# Patient Record
Sex: MALE | Race: WHITE | Employment: OTHER | URBAN - METROPOLITAN AREA
[De-identification: names, ages, dates, MRNs, and addresses within clinical notes are randomized per-mention and may not be internally consistent; named-entity substitution may affect disease eponyms.]

---

## 2017-05-03 ENCOUNTER — OFFICE VISIT (OUTPATIENT)
Dept: CARDIOLOGY | Facility: CLINIC | Age: 82
End: 2017-05-03
Attending: INTERNAL MEDICINE
Payer: COMMERCIAL

## 2017-05-03 VITALS
BODY MASS INDEX: 22.48 KG/M2 | DIASTOLIC BLOOD PRESSURE: 70 MMHG | WEIGHT: 166 LBS | HEART RATE: 72 BPM | HEIGHT: 72 IN | SYSTOLIC BLOOD PRESSURE: 140 MMHG

## 2017-05-03 DIAGNOSIS — I10 ESSENTIAL HYPERTENSION: ICD-10-CM

## 2017-05-03 DIAGNOSIS — E78.00 PURE HYPERCHOLESTEROLEMIA: ICD-10-CM

## 2017-05-03 DIAGNOSIS — I25.10 CORONARY ARTERY DISEASE INVOLVING NATIVE CORONARY ARTERY OF NATIVE HEART WITHOUT ANGINA PECTORIS: ICD-10-CM

## 2017-05-03 PROCEDURE — 99214 OFFICE O/P EST MOD 30 MIN: CPT | Performed by: INTERNAL MEDICINE

## 2017-05-03 RX ORDER — ATORVASTATIN CALCIUM 10 MG/1
10 TABLET, FILM COATED ORAL DAILY
Qty: 90 TABLET | Refills: 3 | Status: SHIPPED | OUTPATIENT
Start: 2017-05-03 | End: 2018-05-17

## 2017-05-03 RX ORDER — LISINOPRIL 10 MG/1
10 TABLET ORAL DAILY
Qty: 90 TABLET | Refills: 3 | Status: SHIPPED | OUTPATIENT
Start: 2017-05-03 | End: 2018-05-17

## 2017-05-03 NOTE — PROGRESS NOTES
HPI and Plan:   See dictation:[id number]    Orders Placed This Encounter   Procedures     NM Exercise stress test (nuc card)     Follow-Up with Cardiologist       Orders Placed This Encounter   Medications     DiphenhydrAMINE HCl (ALLERGY MEDICATION PO)     Sig: Take 1 tablet by mouth as needed     atorvastatin (LIPITOR) 10 MG tablet     Sig: Take 1 tablet (10 mg) by mouth daily     Dispense:  90 tablet     Refill:  3     lisinopril (PRINIVIL/ZESTRIL) 10 MG tablet     Sig: Take 1 tablet (10 mg) by mouth daily     Dispense:  90 tablet     Refill:  3       Medications Discontinued During This Encounter   Medication Reason     atorvastatin (LIPITOR) 10 MG tablet Reorder     lisinopril (PRINIVIL,ZESTRIL) 10 MG tablet Reorder         Encounter Diagnoses   Name Primary?     Coronary artery disease involving native coronary artery of native heart without angina pectoris      Essential hypertension      Pure hypercholesterolemia        CURRENT MEDICATIONS:  Current Outpatient Prescriptions   Medication Sig Dispense Refill     DiphenhydrAMINE HCl (ALLERGY MEDICATION PO) Take 1 tablet by mouth as needed       atorvastatin (LIPITOR) 10 MG tablet Take 1 tablet (10 mg) by mouth daily 90 tablet 3     lisinopril (PRINIVIL/ZESTRIL) 10 MG tablet Take 1 tablet (10 mg) by mouth daily 90 tablet 3     ASPIRIN PO Take 81 mg by mouth        [DISCONTINUED] atorvastatin (LIPITOR) 10 MG tablet Take 1 tablet (10 mg) by mouth daily 90 tablet 3     [DISCONTINUED] lisinopril (PRINIVIL,ZESTRIL) 10 MG tablet Take 1 tablet (10 mg) by mouth daily 90 tablet 3       ALLERGIES   No Known Allergies    PAST MEDICAL HISTORY:  Past Medical History:   Diagnosis Date     Atrial fibrillation (H)      CAD (coronary artery disease)      HLD (hyperlipidaemia)      HTN (hypertension)        PAST SURGICAL HISTORY:  Past Surgical History:   Procedure Laterality Date     BYPASS CARDIOPULMONARY  1999     CORONARY ANGIOGRAPHY ADULT ORDER  2/1998 2/1998: Multivessel  disease, referred for CABG     CORONARY ARTERY BYPASS  2/1998 2/1998: LIMA to LAD, Left radial to diagonal and OM1       FAMILY HISTORY:  Family History   Problem Relation Age of Onset     CANCER Mother        SOCIAL HISTORY:  Social History     Social History     Marital status:      Spouse name: N/A     Number of children: N/A     Years of education: N/A     Social History Main Topics     Smoking status: Never Smoker     Smokeless tobacco: None     Alcohol use Yes      Comment: social     Drug use: No     Sexual activity: Not Asked     Other Topics Concern     Caffeine Concern No     some coffee     Exercise Yes     walking outside in nice weather. DVDs/ Wii     Seat Belt Yes     Social History Narrative       Review of Systems:  Skin:  Negative       Eyes:  Negative      ENT:  Negative      Respiratory:  Negative       Cardiovascular:  Negative      Gastroenterology: Negative      Genitourinary:  not assessed      Musculoskeletal:  Positive for neck pain (and into left shoulder)    Neurologic:  Negative      Psychiatric:  Negative      Heme/Lymph/Imm:  Positive for allergies    Endocrine:  Negative        Physical Exam:  Vitals: /70 (BP Location: Right arm, Cuff Size: Adult Large)  Pulse 72  Ht 1.829 m (6')  Wt 75.3 kg (166 lb)  BMI 22.51 kg/m2    Constitutional:           Skin:           Head:           Eyes:           ENT:           Neck:           Chest:             Cardiac:                    Abdomen:           Vascular:                                          Extremities and Back:                 Neurological:                 CC  Ag Garcia MD   [facility]  [address]  [place] [id number]

## 2017-05-03 NOTE — LETTER
5/3/2017    Amanda J. [facility] [place]   [address]    [facility] [address]    RE: Samuel Enriquez       Dear Colleague,    I again had the pleasure of seeing your patient, Samuel Enriquez, at [facility] for evaluation of coronary artery disease, hyperlipidemia and hypertension.  The patient is status post 3-vessel coronary artery bypass surgery in 02/1999 utilizing the left internal mammary artery to bypass the LAD, left radial artery to the diagonal and first obtuse marginal branch arteries.  He had a 40%-50% mid right coronary artery stenosis with contralateral collaterals filling the mid and distal LAD.  He had normal left ventricular function.  His last nuclear stress test performed 08/2011 demonstrated no areas of ischemia or infarction and a normal ejection fraction.  He denies PND, orthopnea or peripheral edema.  He denies shortness of breath or angina pectoris.  He denies myalgias or myopathy but does note some neck, back and shoulder pain from time to time.  He uses an exercycle 3-4 times per week for 15 minutes and does some walking free of angina.  He denies hospitalizations or surgery since I saw him last year.  His blood pressure has been well treated with low-dose lisinopril.  He continues to help his wife who is being treated for lymphoma.      His fasting lipid profile last performed 10/25/2016 was under excellent control with LDL of 81, HDL 52, total cholesterol 147 and triglycerides 72.  This was after our change from simvastatin to atorvastatin.      PHYSICAL EXAMINATION:   VITAL SIGNS:  Current blood pressure is 140/70, pulse is 72 and regular, weight is 166 pounds, an increase of 3 pounds, BMI is 23.   NECK:  Supple without carotid bruits.  Brisk upstroke.   CHEST:  Clear to auscultation.   CARDIAC:  Regular rate and rhythm, normal S1 and S2 with an S4 gallop but no S3 or murmur.  No JVD.  Pulses were all intact without bruits.  Sternotomy  scar is well-healed.  The left radial harvesting site is normal with excellent distal pulse.   ABDOMEN:  Benign.   EXTREMITIES:  Without cyanosis, clubbing or edema.     Outpatient Encounter Prescriptions as of 5/3/2017   Medication Sig Dispense Refill     DiphenhydrAMINE HCl (ALLERGY MEDICATION PO) Take 1 tablet by mouth as needed       atorvastatin (LIPITOR) 10 MG tablet Take 1 tablet (10 mg) by mouth daily 90 tablet 3     lisinopril (PRINIVIL/ZESTRIL) 10 MG tablet Take 1 tablet (10 mg) by mouth daily 90 tablet 3     ASPIRIN PO Take 81 mg by mouth        [DISCONTINUED] atorvastatin (LIPITOR) 10 MG tablet Take 1 tablet (10 mg) by mouth daily 90 tablet 3     [DISCONTINUED] lisinopril (PRINIVIL,ZESTRIL) 10 MG tablet Take 1 tablet (10 mg) by mouth daily 90 tablet 3     No facility-administered encounter medications on file as of 5/3/2017.       ASSESSMENT:   1.  Samuel Enriquez is a delightful 81-year-old male status post 3-vessel coronary artery bypass surgery in 02/1998.  He remains stable with a normal nuclear stress test in 2011.  We are going to repeat that nuclear stress test in 1 year.  He will continue with his current exercise and call for angina pectoris.   2.  Borderline systolic hypertension.  We will monitor this carefully.  We would not treat his current blood pressure with increasing the lisinopril.   3.  Hyperlipidemia, currently under excellent control.  This can be repeated in 1-2 years.   4.  The patient describes some neck, back and shoulder pain.  It is unlikely to represent myalgias from his atorvastatin but if it worsens we would certainly stop his atorvastatin and see if this has some benefit.  He recently took a trip to [facility] in February and March and did quite well.  He is planning a trip this summer as well and if he is having difficulty with muscle weakness or myalgias he will call.      It is my pleasure to assist in the care of Samuel Enriquez.  All his questions were answered to his  satisfaction.     Sincerely,    Ag Garcia MD     [facility]

## 2017-05-03 NOTE — MR AVS SNAPSHOT
"              After Visit Summary   5/3/2017    Samuel Enriquez    MRN: [id number]           Patient Information     Date Of Birth          1935        Visit Information        Provider Department      5/3/2017 11:00 AM Ag Garcia MD [facility]        Today's Diagnoses     Coronary artery disease involving native coronary artery of native heart without angina pectoris        Essential hypertension        Pure hypercholesterolemia           Follow-ups after your visit        Additional Services     Follow-Up with Cardiologist                 Future tests that were ordered for you today     Open Future Orders        Priority Expected Expires Ordered    Follow-Up with Cardiologist Routine 5/3/2018 9/15/2018 5/3/2017    NM Exercise stress test (nuc card) Routine 5/3/2018 6/7/2018 5/3/2017            Who to contact     If you have questions or need follow up information about today's clinic visit or your schedule please contact [facility] directly at [phone].  Normal or non-critical lab and imaging results will be communicated to you by MyChart, letter or phone within 4 business days after the clinic has received the results. If you do not hear from us within 7 days, please contact the clinic through WeGreek[employer] or phone. If you have a critical or abnormal lab result, we will notify you by phone as soon as possible.  Submit refill requests through [employer] or call your pharmacy and they will forward the refill request to us. Please allow 3 business days for your refill to be completed.          Additional Information About Your Visit        WeGreek[employer] Information     [employer]otomo lets you send messages to your doctor, view your test results, renew your prescriptions, schedule appointments and more. To sign up, go to [email or web address] . Click on \"Log in\" on the left side of the screen, which will take you to the Welcome page. " "Then click on \"Sign up Now\" on the right side of the page.     You will be asked to enter the access code listed below, as well as some personal information. Please follow the directions to create your username and password.     Your access code is: [email or web address]  Expires: 2017 11:36 AM     Your access code will  in 90 days. If you need help or a new code, please call your [facility] clinic or [phone].        Care EveryWhere ID     This is your Care EveryWhere ID. This could be used by other organizations to access your [facility] medical records  [id number]        Your Vitals Were     Pulse Height BMI (Body Mass Index)             72 1.829 m (6') 22.51 kg/m2          Blood Pressure from Last 3 Encounters:   17 140/70   16 135/70   05/16/15 123/64    Weight from Last 3 Encounters:   17 75.3 kg (166 lb)   16 73.9 kg (163 lb)   05/16/15 70.3 kg (155 lb)              We Performed the Following     Follow-Up with Cardiologist          Where to get your medicines      These medications were sent to [facility] [address] [facility] [address] AT [facility] [address]  [address] [facility] [address]     Phone:  [phone]     atorvastatin 10 MG tablet    lisinopril 10 MG tablet          Primary Care Provider Office Phone # Fax #    Katerina YAP Carlos [phone]       [facility] [address]    [facility] [address]        Thank you!     Thank you for choosing [facility] [place]  for your care. Our goal is always to provide you with excellent care. Hearing back from our patients is one way we can continue to improve our services. Please take a few minutes to complete the written survey that you may receive in the mail after your visit with us. Thank you!             Your Updated Medication List - Protect others around you: Learn how to safely use, store and throw away your medicines at " www.disposemymeds.org.          This list is accurate as of: 5/3/17 11:36 AM.  Always use your most recent med list.                   Brand Name Dispense Instructions for use    ALLERGY MEDICATION PO      Take 1 tablet by mouth as needed       ASPIRIN PO      Take 81 mg by mouth       atorvastatin 10 MG tablet    LIPITOR    90 tablet    Take 1 tablet (10 mg) by mouth daily       lisinopril 10 MG tablet    PRINIVIL/ZESTRIL    90 tablet    Take 1 tablet (10 mg) by mouth daily

## 2017-05-04 NOTE — PROGRESS NOTES
HISTORY OF PRESENT ILLNESS:  I again had the pleasure of seeing your patient, Samuel Enriquez, at [facility] for evaluation of coronary artery disease, hyperlipidemia and hypertension.  The patient is status post 3-vessel coronary artery bypass surgery in 02/1999 utilizing the left internal mammary artery to bypass the LAD, left radial artery to the diagonal and first obtuse marginal branch arteries.  He had a 40%-50% mid right coronary artery stenosis with contralateral collaterals filling the mid and distal LAD.  He had normal left ventricular function.  His last nuclear stress test performed 08/2011 demonstrated no areas of ischemia or infarction and a normal ejection fraction.  He denies PND, orthopnea or peripheral edema.  He denies shortness of breath or angina pectoris.  He denies myalgias or myopathy but does note some neck, back and shoulder pain from time to time.  He uses an exercycle 3-4 times per week for 15 minutes and does some walking free of angina.  He denies hospitalizations or surgery since I saw him last year.  His blood pressure has been well treated with low-dose lisinopril.  He continues to help his wife who is being treated for lymphoma.      His fasting lipid profile last performed 10/25/2016 was under excellent control with LDL of 81, HDL 52, total cholesterol 147 and triglycerides 72.  This was after our change from simvastatin to atorvastatin.      PHYSICAL EXAMINATION:   VITAL SIGNS:  Current blood pressure is 140/70, pulse is 72 and regular, weight is 166 pounds, an increase of 3 pounds, BMI is 23.   NECK:  Supple without carotid bruits.  Brisk upstroke.   CHEST:  Clear to auscultation.   CARDIAC:  Regular rate and rhythm, normal S1 and S2 with an S4 gallop but no S3 or murmur.  No JVD.  Pulses were all intact without bruits.  Sternotomy scar is well-healed.  The left radial harvesting site is normal with excellent distal pulse.   ABDOMEN:  Benign.   EXTREMITIES:   Without cyanosis, clubbing or edema.      ASSESSMENT:   1.  Samuel Loza is a delightful 81-year-old male status post 3-vessel coronary artery bypass surgery in 1998.  He remains stable with a normal nuclear stress test in .  We are going to repeat that nuclear stress test in 1 year.  He will continue with his current exercise and call for angina pectoris.   2.  Borderline systolic hypertension.  We will monitor this carefully.  We would not treat his current blood pressure with increasing the lisinopril.   3.  Hyperlipidemia, currently under excellent control.  This can be repeated in 1-2 years.   4.  The patient describes some neck, back and shoulder pain.  It is unlikely to represent myalgias from his atorvastatin but if it worsens we would certainly stop his atorvastatin and see if this has some benefit.  He recently took a trip to [facility] in February and March and did quite well.  He is planning a trip this summer as well and if he is having difficulty with muscle weakness or myalgias he will call.      It is my pleasure to assist in the care of Samuel Loza.  All his questions were answered to his satisfaction.      cc:   Katerina Gonzalez MD    [facility]    [address] [place]  [address]         JUSTINE KONG MD, [facility]             D: 2017 11:52   T: 2017 01:43   MT: KELSEY      Name:     SAMUEL LZOA   MRN:      [id number]        Account:      [id number]   :      1935           Service Date: 2017      Document: [id number]

## 2018-02-15 ENCOUNTER — TELEPHONE (OUTPATIENT)
Dept: CARDIOLOGY | Facility: CLINIC | Age: 83
End: 2018-02-15

## 2018-02-15 NOTE — TELEPHONE ENCOUNTER
Patient has had recurrent nose bleeds with multiple cauterizations. Today while at ENT for another cauterization the ENT suggested to the patient that it would be beneficial if the patient could hold the aspirin for one week or if he could take half a baby aspirin daily. He had trouble stopping the bleed today and thought one of these would be the best option however, he would like to defer back to Dr. Garcia. Patient has known CAD s/p CABG x 3 in 1998 with a stable with a normal nuclear stress test in 2011. Will route to Dr. Garcia to review.

## 2018-02-16 NOTE — TELEPHONE ENCOUNTER
I am OK holding the ASA for 10 days.  I would then suggest taking the ASA every other day for 10 days and then 81 mg daily.  Thanks.  Ag Garcia MD, FACC  February 15, 2018 11:06 PM

## 2018-02-16 NOTE — TELEPHONE ENCOUNTER
Contacted patient to review Dr. Garcia's recommendation. Reviewed with patient to hold his ASA for 10 days, then take ASA every other day for 10 days, and then resume 81 mg daily of ASA. Used teach-back method to ensure patient understood directions. Patient repeated instructions, verbalized understanding, and agreed with plan of care.

## 2018-05-17 DIAGNOSIS — E78.00 PURE HYPERCHOLESTEROLEMIA: ICD-10-CM

## 2018-05-17 DIAGNOSIS — I25.10 CORONARY ARTERY DISEASE INVOLVING NATIVE CORONARY ARTERY OF NATIVE HEART WITHOUT ANGINA PECTORIS: ICD-10-CM

## 2018-05-17 RX ORDER — ATORVASTATIN CALCIUM 10 MG/1
10 TABLET, FILM COATED ORAL DAILY
Qty: 90 TABLET | Refills: 0 | Status: SHIPPED | OUTPATIENT
Start: 2018-05-17 | End: 2018-08-02

## 2018-05-17 RX ORDER — LISINOPRIL 10 MG/1
10 TABLET ORAL DAILY
Qty: 90 TABLET | Refills: 0 | Status: SHIPPED | OUTPATIENT
Start: 2018-05-17 | End: 2018-08-02

## 2018-07-24 ENCOUNTER — HOSPITAL ENCOUNTER (OUTPATIENT)
Dept: CARDIOLOGY | Facility: CLINIC | Age: 83
Discharge: HOME | End: 2018-07-24
Attending: INTERNAL MEDICINE | Admitting: INTERNAL MEDICINE
Payer: MEDICARE

## 2018-07-24 DIAGNOSIS — I25.10 CORONARY ARTERY DISEASE INVOLVING NATIVE CORONARY ARTERY OF NATIVE HEART WITHOUT ANGINA PECTORIS: ICD-10-CM

## 2018-07-24 PROCEDURE — 78452 HT MUSCLE IMAGE SPECT MULT: CPT | Mod: 26 | Performed by: INTERNAL MEDICINE

## 2018-07-24 PROCEDURE — 78452 HT MUSCLE IMAGE SPECT MULT: CPT

## 2018-07-24 PROCEDURE — A9502 TC99M TETROFOSMIN: HCPCS | Performed by: INTERNAL MEDICINE

## 2018-07-24 PROCEDURE — 93018 CV STRESS TEST I&R ONLY: CPT | Performed by: INTERNAL MEDICINE

## 2018-07-24 PROCEDURE — 93016 CV STRESS TEST SUPVJ ONLY: CPT | Performed by: INTERNAL MEDICINE

## 2018-07-24 PROCEDURE — 34300033 ZZH RX 343: Performed by: INTERNAL MEDICINE

## 2018-07-24 RX ORDER — AMINOPHYLLINE 25 MG/ML
50-100 INJECTION, SOLUTION INTRAVENOUS
Status: DISCONTINUED | OUTPATIENT
Start: 2018-07-24 | End: 2018-07-25 | Stop reason: HOSPADM

## 2018-07-24 RX ORDER — REGADENOSON 0.08 MG/ML
0.4 INJECTION, SOLUTION INTRAVENOUS ONCE
Status: DISCONTINUED | OUTPATIENT
Start: 2018-07-24 | End: 2018-07-25 | Stop reason: HOSPADM

## 2018-07-24 RX ORDER — ACYCLOVIR 200 MG/1
0-1 CAPSULE ORAL
Status: DISCONTINUED | OUTPATIENT
Start: 2018-07-24 | End: 2018-07-25 | Stop reason: HOSPADM

## 2018-07-24 RX ORDER — ALBUTEROL SULFATE 90 UG/1
2 AEROSOL, METERED RESPIRATORY (INHALATION) EVERY 5 MIN PRN
Status: DISCONTINUED | OUTPATIENT
Start: 2018-07-24 | End: 2018-07-25 | Stop reason: HOSPADM

## 2018-07-24 RX ADMIN — TETROFOSMIN 3.36 MCI.: 1.38 INJECTION, POWDER, LYOPHILIZED, FOR SOLUTION INTRAVENOUS at 08:25

## 2018-07-24 RX ADMIN — TETROFOSMIN 9.08 MCI.: 1.38 INJECTION, POWDER, LYOPHILIZED, FOR SOLUTION INTRAVENOUS at 10:07

## 2018-08-02 ENCOUNTER — OFFICE VISIT (OUTPATIENT)
Dept: CARDIOLOGY | Facility: CLINIC | Age: 83
End: 2018-08-02
Attending: INTERNAL MEDICINE
Payer: COMMERCIAL

## 2018-08-02 VITALS
HEIGHT: 72 IN | DIASTOLIC BLOOD PRESSURE: 60 MMHG | HEART RATE: 68 BPM | WEIGHT: 166 LBS | SYSTOLIC BLOOD PRESSURE: 124 MMHG | BODY MASS INDEX: 22.48 KG/M2

## 2018-08-02 DIAGNOSIS — E78.00 PURE HYPERCHOLESTEROLEMIA: ICD-10-CM

## 2018-08-02 DIAGNOSIS — I25.10 CORONARY ARTERY DISEASE INVOLVING NATIVE CORONARY ARTERY OF NATIVE HEART WITHOUT ANGINA PECTORIS: Primary | ICD-10-CM

## 2018-08-02 DIAGNOSIS — Z95.1 HX OF CORONARY ARTERY BYPASS SURGERY: ICD-10-CM

## 2018-08-02 DIAGNOSIS — I10 ESSENTIAL HYPERTENSION: ICD-10-CM

## 2018-08-02 PROCEDURE — 99214 OFFICE O/P EST MOD 30 MIN: CPT | Performed by: INTERNAL MEDICINE

## 2018-08-02 RX ORDER — ATORVASTATIN CALCIUM 10 MG/1
10 TABLET, FILM COATED ORAL DAILY
Qty: 90 TABLET | Refills: 3 | Status: SHIPPED | OUTPATIENT
Start: 2018-08-02 | End: 2019-09-04

## 2018-08-02 RX ORDER — LISINOPRIL 10 MG/1
10 TABLET ORAL DAILY
Qty: 90 TABLET | Refills: 3 | Status: SHIPPED | OUTPATIENT
Start: 2018-08-02 | End: 2019-08-05

## 2018-08-02 NOTE — PROGRESS NOTES
HPI and Plan:   See dictation:[id number]    Orders Placed This Encounter   Procedures     Lipid Profile     Follow-Up with Cardiologist       Orders Placed This Encounter   Medications     atorvastatin (LIPITOR) 10 MG tablet     Sig: Take 1 tablet (10 mg) by mouth daily     Dispense:  90 tablet     Refill:  3     lisinopril (PRINIVIL/ZESTRIL) 10 MG tablet     Sig: Take 1 tablet (10 mg) by mouth daily     Dispense:  90 tablet     Refill:  3       Medications Discontinued During This Encounter   Medication Reason     atorvastatin (LIPITOR) 10 MG tablet Reorder     lisinopril (PRINIVIL/ZESTRIL) 10 MG tablet Reorder         Encounter Diagnoses   Name Primary?     Coronary artery disease involving native coronary artery of native heart without angina pectoris Yes     Hx of coronary artery bypass surgery      Essential hypertension      Pure hypercholesterolemia        CURRENT MEDICATIONS:  Current Outpatient Prescriptions   Medication Sig Dispense Refill     ASPIRIN PO Take 81 mg by mouth        atorvastatin (LIPITOR) 10 MG tablet Take 1 tablet (10 mg) by mouth daily 90 tablet 3     DiphenhydrAMINE HCl (ALLERGY MEDICATION PO) Take 1 tablet by mouth as needed       lisinopril (PRINIVIL/ZESTRIL) 10 MG tablet Take 1 tablet (10 mg) by mouth daily 90 tablet 3     [DISCONTINUED] atorvastatin (LIPITOR) 10 MG tablet Take 1 tablet (10 mg) by mouth daily 90 tablet 0     [DISCONTINUED] lisinopril (PRINIVIL/ZESTRIL) 10 MG tablet Take 1 tablet (10 mg) by mouth daily 90 tablet 0       ALLERGIES   No Known Allergies    PAST MEDICAL HISTORY:  Past Medical History:   Diagnosis Date     Atrial fibrillation (H)      CAD (coronary artery disease)      HLD (hyperlipidaemia)      HTN (hypertension)        PAST SURGICAL HISTORY:  Past Surgical History:   Procedure Laterality Date     BYPASS CARDIOPULMONARY  1999     CORONARY ANGIOGRAPHY ADULT ORDER  2/1998 2/1998: Multivessel disease, referred for CABG     CORONARY ARTERY BYPASS  2/1998     2/1998: LIMA to LAD, Left radial to diagonal and OM1       FAMILY HISTORY:  Family History   Problem Relation Age of Onset     Cancer Mother        SOCIAL HISTORY:  Social History     Social History     Marital status:      Spouse name: N/A     Number of children: N/A     Years of education: N/A     Social History Main Topics     Smoking status: Never Smoker     Smokeless tobacco: Never Used     Alcohol use Yes      Comment: social     Drug use: No     Sexual activity: Not Asked     Other Topics Concern     Caffeine Concern No     some coffee     Exercise Yes     walking outside in nice weather. DVDs/ Wii     Seat Belt Yes     Social History Narrative       Review of Systems:  Skin:  Negative       Eyes:  Negative      ENT:  Negative      Respiratory:  Negative       Cardiovascular:  Negative      Gastroenterology: Negative      Genitourinary:  not assessed      Musculoskeletal:  Negative      Neurologic:  Negative      Psychiatric:  Negative      Heme/Lymph/Imm:  Positive for allergies    Endocrine:  Negative        Physical Exam:  Vitals: /60  Pulse 68  Ht 1.829 m (6')  Wt 75.3 kg (166 lb)  BMI 22.51 kg/m2    Constitutional:  cooperative, alert and oriented, well developed, well nourished, in no acute distress        Skin:  warm and dry to the touch, no apparent skin lesions or masses noted          Head:  normocephalic, no masses or lesions        Eyes:  pupils equal and round, conjunctivae and lids unremarkable, sclera white, no xanthalasma, EOMS intact, no nystagmus        Lymph:      ENT:  no pallor or cyanosis, dentition good        Neck:  carotid pulses are full and equal bilaterally, JVP normal, no carotid bruit        Respiratory:  normal breath sounds, clear to auscultation, normal A-P diameter, normal symmetry, normal respiratory excursion, no use of accessory muscles;healed median sternotomy scar         Cardiac: regular rhythm;normal S1 and S2;apical impulse not displaced   S4 no  presence of murmur          pulses full and equal, no bruits auscultated                                        GI:  abdomen soft, non-tender, BS normoactive, no mass, no HSM, no bruits        Extremities and Muscular Skeletal:  no deformities, clubbing, cyanosis, erythema observed;no edema         left radial artery harvest scar is well healed with normal pulse to his hand    Neurological:  no gross motor deficits;affect appropriate        Psych:  Alert and Oriented x 3        CC  Ag Garcia MD  [address]  [place] [id number]

## 2018-08-02 NOTE — LETTER
8/2/2018    [facility] [place] [address]    [place] [address]    RE: Samuel Enriquez       Dear Colleague,    I had the pleasure of seeing Samuel Enriquez in the [facility].    HPI and Plan:   See dictation:[id number]    Orders Placed This Encounter   Procedures     Lipid Profile     Follow-Up with Cardiologist       Orders Placed This Encounter   Medications     atorvastatin (LIPITOR) 10 MG tablet     Sig: Take 1 tablet (10 mg) by mouth daily     Dispense:  90 tablet     Refill:  3     lisinopril (PRINIVIL/ZESTRIL) 10 MG tablet     Sig: Take 1 tablet (10 mg) by mouth daily     Dispense:  90 tablet     Refill:  3       Medications Discontinued During This Encounter   Medication Reason     atorvastatin (LIPITOR) 10 MG tablet Reorder     lisinopril (PRINIVIL/ZESTRIL) 10 MG tablet Reorder         Encounter Diagnoses   Name Primary?     Coronary artery disease involving native coronary artery of native heart without angina pectoris Yes     Hx of coronary artery bypass surgery      Essential hypertension      Pure hypercholesterolemia        CURRENT MEDICATIONS:  Current Outpatient Prescriptions   Medication Sig Dispense Refill     ASPIRIN PO Take 81 mg by mouth        atorvastatin (LIPITOR) 10 MG tablet Take 1 tablet (10 mg) by mouth daily 90 tablet 3     DiphenhydrAMINE HCl (ALLERGY MEDICATION PO) Take 1 tablet by mouth as needed       lisinopril (PRINIVIL/ZESTRIL) 10 MG tablet Take 1 tablet (10 mg) by mouth daily 90 tablet 3     [DISCONTINUED] atorvastatin (LIPITOR) 10 MG tablet Take 1 tablet (10 mg) by mouth daily 90 tablet 0     [DISCONTINUED] lisinopril (PRINIVIL/ZESTRIL) 10 MG tablet Take 1 tablet (10 mg) by mouth daily 90 tablet 0       ALLERGIES   No Known Allergies    PAST MEDICAL HISTORY:  Past Medical History:   Diagnosis Date     Atrial fibrillation (H)      CAD (coronary artery disease)      HLD (hyperlipidaemia)      HTN (hypertension)         PAST SURGICAL HISTORY:  Past Surgical History:   Procedure Laterality Date     BYPASS CARDIOPULMONARY  1999     CORONARY ANGIOGRAPHY ADULT ORDER  2/1998 2/1998: Multivessel disease, referred for CABG     CORONARY ARTERY BYPASS  2/1998 2/1998: LIMA to LAD, Left radial to diagonal and OM1       FAMILY HISTORY:  Family History   Problem Relation Age of Onset     Cancer Mother        SOCIAL HISTORY:  Social History     Social History     Marital status:      Spouse name: N/A     Number of children: N/A     Years of education: N/A     Social History Main Topics     Smoking status: Never Smoker     Smokeless tobacco: Never Used     Alcohol use Yes      Comment: social     Drug use: No     Sexual activity: Not Asked     Other Topics Concern     Caffeine Concern No     some coffee     Exercise Yes     walking outside in nice weather. DVDs/ Wii     Seat Belt Yes     Social History Narrative       Review of Systems:  Skin:  Negative       Eyes:  Negative      ENT:  Negative      Respiratory:  Negative       Cardiovascular:  Negative      Gastroenterology: Negative      Genitourinary:  not assessed      Musculoskeletal:  Negative      Neurologic:  Negative      Psychiatric:  Negative      Heme/Lymph/Imm:  Positive for allergies    Endocrine:  Negative        Physical Exam:  Vitals: /60  Pulse 68  Ht 1.829 m (6')  Wt 75.3 kg (166 lb)  BMI 22.51 kg/m2    Constitutional:  cooperative, alert and oriented, well developed, well nourished, in no acute distress        Skin:  warm and dry to the touch, no apparent skin lesions or masses noted          Head:  normocephalic, no masses or lesions        Eyes:  pupils equal and round, conjunctivae and lids unremarkable, sclera white, no xanthalasma, EOMS intact, no nystagmus        Lymph:      ENT:  no pallor or cyanosis, dentition good        Neck:  carotid pulses are full and equal bilaterally, JVP normal, no carotid bruit        Respiratory:  normal breath  sounds, clear to auscultation, normal A-P diameter, normal symmetry, normal respiratory excursion, no use of accessory muscles;healed median sternotomy scar         Cardiac: regular rhythm;normal S1 and S2;apical impulse not displaced   S4 no presence of murmur          pulses full and equal, no bruits auscultated                                        GI:  abdomen soft, non-tender, BS normoactive, no mass, no HSM, no bruits        Extremities and Muscular Skeletal:  no deformities, clubbing, cyanosis, erythema observed;no edema         left radial artery harvest scar is well healed with normal pulse to his hand    Neurological:  no gross motor deficits;affect appropriate        Psych:  Alert and Oriented x 3        CC  Ag Garcia MD  [address]  [place] [address]                Thank you for allowing me to participate in the care of your patient.      Sincerely,     Ag Garcia MD     [facility]    cc:   Ag Garcia MD  [address]  [place] [address]

## 2018-08-02 NOTE — LETTER
8/2/2018      [facility] [place] [address]     [place] [address]      RE: Samuel Enriquez       Dear Colleague,    I had the pleasure of seeing Samuel Enriquez in the [facility].    Service Date: 08/02/2018      PRIMARY CARE PHYSICIAN:  Dr. Katerina Gonzalez.        HISTORY OF PRESENT ILLNESS:  I again had the pleasure of seeing your patient, Samuel Enriquez, at [facility] for evaluation of coronary artery disease, hyperlipidemia and hypertension.  The patient is status post 3-vessel coronary artery bypass surgery in 02/1999 utilizing the left internal mammary artery to bypass the LAD, left radial artery to the diagonal and first obtuse marginal branch arteries.  He had a 40%-50% mid right coronary artery stenosis with contralateral collaterals filling the mid and distal LAD.  He had normal left ventricular function.  A nuclear stress test has been performed on 07/24 demonstrating no areas of ischemia or infarction.  Ejection fraction was 62%.  This was unchanged from 02/08/2011.  The patient is quite active including a recent hiking trip in [place] and then [place], [place].  He had no shortness of breath or angina pectoris.  He exercises 5 times a week for 30 minutes either walking or using an elliptical cross- and some stretching exercises.  He remains very active.  He denies PND, orthopnea or peripheral edema.  He denies shortness of breath or angina pectoris.  He denies myalgias or myopathy.  He had some epistaxis during the winter requiring emergency room visits.  He is now using Vaseline in his nose to try to prevent any further epistaxis.  He also has some easy bruising on his arms likely due to his aspirin therapy.  His blood pressure has been under excellent control.  His most recent lipid profile on 10/25/2016 was previously discussed in my last note.      PHYSICAL EXAMINATION:  As listed below.       ASSESSMENT:   1.  Samuel Loza is a delightful 83-year-old male status post 3-vessel coronary artery bypass surgery in 1998.  He remains stable with a normal nuclear stress test on .  He will continue with his current exercise.  I have filled all of his medications for the next year.   2.  Borderline systolic hypertension in the past, currently under excellent control.  I would not change the dose of his lisinopril.   3.  Hyperlipidemia currently under excellent control and will be checked in 1 year.      It is a pleasure to see Samuel Loza doing well.  All his questions were answered to his satisfaction.  It is my pleasure to assist in his care.      Justine Kong MD       cc:   Katerina Gonzalez MD    [facility]    [address] [place] [address]         JUSTINE KONG MD, [facility]      D: 2018   T: 2018   MT: KELSEY      Name:     SAMUEL LOZA   MRN:      [id number]        Account:      [id number]   :      1935           Service Date: 2018      Document: [id number]      Outpatient Encounter Prescriptions as of 2018   Medication Sig Dispense Refill     ASPIRIN PO Take 81 mg by mouth        atorvastatin (LIPITOR) 10 MG tablet Take 1 tablet (10 mg) by mouth daily 90 tablet 3     DiphenhydrAMINE HCl (ALLERGY MEDICATION PO) Take 1 tablet by mouth as needed       lisinopril (PRINIVIL/ZESTRIL) 10 MG tablet Take 1 tablet (10 mg) by mouth daily 90 tablet 3     [DISCONTINUED] atorvastatin (LIPITOR) 10 MG tablet Take 1 tablet (10 mg) by mouth daily 90 tablet 0     [DISCONTINUED] lisinopril (PRINIVIL/ZESTRIL) 10 MG tablet Take 1 tablet (10 mg) by mouth daily 90 tablet 0     No facility-administered encounter medications on file as of 2018.      Again, thank you for allowing me to participate in the care of your patient.      Sincerely,    Justine Kong MD     [facility]

## 2018-08-02 NOTE — MR AVS SNAPSHOT
After Visit Summary   8/2/2018    Samuel Enriquez    MRN: [id number]           Patient Information     Date Of Birth          1935        Visit Information        Provider Department      8/2/2018 3:15 PM Ag Garcia MD [facility]        Today's Diagnoses     Coronary artery disease involving native coronary artery of native heart without angina pectoris    -  1    Hx of coronary artery bypass surgery        Essential hypertension        Pure hypercholesterolemia           Follow-ups after your visit        Additional Services     Follow-Up with Cardiologist                 Future tests that were ordered for you today     Open Future Orders        Priority Expected Expires Ordered    Lipid Profile Routine 8/2/2019 8/3/2019 8/2/2018    Follow-Up with Cardiologist Routine 8/2/2019 8/3/2019 8/2/2018            Who to contact     If you have questions or need follow up information about today's clinic visit or your schedule please contact [facility] directly at [phone].  Normal or non-critical lab and imaging results will be communicated to you by MyChart, letter or phone within 4 business days after the clinic has received the results. If you do not hear from us within 7 days, please contact the clinic through MyChart or phone. If you have a critical or abnormal lab result, we will notify you by phone as soon as possible.  Submit refill requests through [employer] or call your pharmacy and they will forward the refill request to us. Please allow 3 business days for your refill to be completed.          Additional Information About Your Visit        Care EveryWhere ID     This is your Care EveryWhere ID. This could be used by other organizations to access your [place] medical records  [id number]        Your Vitals Were     Pulse Height BMI (Body Mass Index)             68 1.829 m (6') 22.51 kg/m2          Blood  Pressure from Last 3 Encounters:   08/02/18 124/60   05/03/17 140/70   05/04/16 135/70    Weight from Last 3 Encounters:   08/02/18 75.3 kg (166 lb)   05/03/17 75.3 kg (166 lb)   05/04/16 73.9 kg (163 lb)              We Performed the Following     Follow-Up with Cardiologist          Where to get your medicines      These medications were sent to [id number] [address] [facility] [address]  [address], [facility] [address]     Phone:  [phone]     atorvastatin 10 MG tablet    lisinopril 10 MG tablet          Primary Care Provider Office Phone # Fax #    Katerina Gonzalez [phone]       [facility] [place] [address]    [facility] [address]        Equal Access to Services     MARYAM CLARK : Hadii aad ku hadasho Soomaali, waaxda luqadaha, qaybta kaalmada adeegyada, waxay idiin hayaan jyothi begum. So [facility] [phone].    ATENCIÓN: Si habla español, tiene a sarabia disposición servicios gratuitos de asistencia lingüística. Llame al [phone].    We comply with applicable federal civil rights laws and [place] laws. We do not discriminate on the basis of race, color, national origin, age, disability, sex, sexual orientation, or gender identity.            Thank you!     Thank you for choosing [facility]  for your care. Our goal is always to provide you with excellent care. Hearing back from our patients is one way we can continue to improve our services. Please take a few minutes to complete the written survey that you may receive in the mail after your visit with us. Thank you!             Your Updated Medication List - Protect others around you: Learn how to safely use, store and throw away your medicines at www.disposemymeds.org.          This list is accurate as of 8/2/18  3:53 PM.  Always use your most recent med list.                   Brand Name Dispense Instructions for use  Diagnosis    ALLERGY MEDICATION PO      Take 1 tablet by mouth as needed        ASPIRIN PO      Take 81 mg by mouth        atorvastatin 10 MG tablet    LIPITOR    90 tablet    Take 1 tablet (10 mg) by mouth daily    Pure hypercholesterolemia       lisinopril 10 MG tablet    PRINIVIL/ZESTRIL    90 tablet    Take 1 tablet (10 mg) by mouth daily    Coronary artery disease involving native coronary artery of native heart without angina pectoris

## 2018-08-03 NOTE — PROGRESS NOTES
Service Date: 08/02/2018      PRIMARY CARE PHYSICIAN:  Dr. Katerina Gonzalez.        HISTORY OF PRESENT ILLNESS:  I again had the pleasure of seeing your patient, Samuel Enriquez, at [facility] for evaluation of coronary artery disease, hyperlipidemia and hypertension.  The patient is status post 3-vessel coronary artery bypass surgery in 02/1999 utilizing the left internal mammary artery to bypass the LAD, left radial artery to the diagonal and first obtuse marginal branch arteries.  He had a 40%-50% mid right coronary artery stenosis with contralateral collaterals filling the mid and distal LAD.  He had normal left ventricular function.  A nuclear stress test has been performed on 07/24 demonstrating no areas of ischemia or infarction.  Ejection fraction was 62%.  This was unchanged from 02/08/2011.  The patient is quite active including a recent hiking trip in [place] and then [place], [place].  He had no shortness of breath or angina pectoris.  He exercises 5 times a week for 30 minutes either walking or using an elliptical cross- and some stretching exercises.  He remains very active.  He denies PND, orthopnea or peripheral edema.  He denies shortness of breath or angina pectoris.  He denies myalgias or myopathy.  He had some epistaxis during the winter requiring emergency room visits.  He is now using Vaseline in his nose to try to prevent any further epistaxis.  He also has some easy bruising on his arms likely due to his aspirin therapy.  His blood pressure has been under excellent control.  His most recent lipid profile on 10/25/2016 was previously discussed in my last note.      PHYSICAL EXAMINATION:  As listed below.      ASSESSMENT:   1.  Samuel Enriquez is a delightful 83-year-old male status post 3-vessel coronary artery bypass surgery in 02/1998.  He remains stable with a normal nuclear stress test on 07/24.  He will continue with his current exercise.  I have filled all of  his medications for the next year.   2.  Borderline systolic hypertension in the past, currently under excellent control.  I would not change the dose of his lisinopril.   3.  Hyperlipidemia currently under excellent control and will be checked in 1 year.      It is a pleasure to see Samuel Loza doing well.  All his questions were answered to his satisfaction.  It is my pleasure to assist in his care.      Justine Kong MD       cc:   Katerina Gonzalez MD    [facility]    [address] [place] [address]         JUSTINE KONG MD, FACC             D: 2018   T: 2018   MT: KELSEY      Name:     SAMUEL LOZA   MRN:      [id number]        Account:      [id number]   :      1935           Service Date: 2018      Document: [id number]

## 2019-08-05 DIAGNOSIS — I25.10 CORONARY ARTERY DISEASE INVOLVING NATIVE CORONARY ARTERY OF NATIVE HEART WITHOUT ANGINA PECTORIS: ICD-10-CM

## 2019-08-05 RX ORDER — LISINOPRIL 10 MG/1
10 TABLET ORAL DAILY
Qty: 90 TABLET | Refills: 1 | Status: SHIPPED | OUTPATIENT
Start: 2019-08-05 | End: 2019-09-04

## 2019-08-05 NOTE — TELEPHONE ENCOUNTER
LOV 8/2/18. RN refilled rx per RN refill protocol.               8/2/18 OV Dr. Garcia  ASSESSMENT:   1.  Samuel Enriquez is a delightful 83-year-old male status post 3-vessel coronary artery bypass surgery in 02/1998.  He remains stable with a normal nuclear stress test on 07/24.  He will continue with his current exercise.  I have filled all of his medications for the next year.   2.  Borderline systolic hypertension in the past, currently under excellent control.  I would not change the dose of his lisinopril.   3.  Hyperlipidemia currently under excellent control and will be checked in 1 year.      It is a pleasure to see Samuel Enriquez doing well.  All his questions were answered to his satisfaction.  It is my pleasure to assist in his care.      Ag Garcia MD       cc:   Katerina Gonzalez MD    [facility]    [address] [place]  [address]         AG GARCIA MD, FACC

## 2019-09-03 DIAGNOSIS — E78.5 HYPERLIPIDEMIA LDL GOAL <100: Primary | ICD-10-CM

## 2019-09-04 ENCOUNTER — TELEPHONE (OUTPATIENT)
Dept: CARDIOLOGY | Facility: CLINIC | Age: 84
End: 2019-09-04

## 2019-09-04 ENCOUNTER — OFFICE VISIT (OUTPATIENT)
Dept: CARDIOLOGY | Facility: CLINIC | Age: 84
End: 2019-09-04
Payer: MEDICARE

## 2019-09-04 VITALS
SYSTOLIC BLOOD PRESSURE: 119 MMHG | HEART RATE: 75 BPM | BODY MASS INDEX: 21.47 KG/M2 | HEIGHT: 73 IN | DIASTOLIC BLOOD PRESSURE: 67 MMHG | WEIGHT: 162 LBS

## 2019-09-04 DIAGNOSIS — I25.10 CORONARY ARTERY DISEASE INVOLVING NATIVE CORONARY ARTERY OF NATIVE HEART WITHOUT ANGINA PECTORIS: ICD-10-CM

## 2019-09-04 DIAGNOSIS — E78.00 PURE HYPERCHOLESTEROLEMIA: ICD-10-CM

## 2019-09-04 DIAGNOSIS — E78.5 HYPERLIPIDEMIA LDL GOAL <100: ICD-10-CM

## 2019-09-04 LAB
ALT SERPL W P-5'-P-CCNC: <5 U/L (ref 5–30)
CHOLEST SERPL-MCNC: 135 MG/DL
HDLC SERPL-MCNC: 49 MG/DL
LDLC SERPL CALC-MCNC: 75 MG/DL
NONHDLC SERPL-MCNC: 86 MG/DL
TRIGL SERPL-MCNC: 55 MG/DL

## 2019-09-04 PROCEDURE — 80061 LIPID PANEL: CPT | Performed by: INTERNAL MEDICINE

## 2019-09-04 PROCEDURE — 99213 OFFICE O/P EST LOW 20 MIN: CPT | Performed by: NURSE PRACTITIONER

## 2019-09-04 PROCEDURE — 36415 COLL VENOUS BLD VENIPUNCTURE: CPT | Performed by: INTERNAL MEDICINE

## 2019-09-04 PROCEDURE — 84460 ALANINE AMINO (ALT) (SGPT): CPT | Performed by: INTERNAL MEDICINE

## 2019-09-04 RX ORDER — ATORVASTATIN CALCIUM 10 MG/1
10 TABLET, FILM COATED ORAL DAILY
Qty: 90 TABLET | Refills: 3 | Status: SHIPPED | OUTPATIENT
Start: 2019-09-04 | End: 2020-08-27

## 2019-09-04 RX ORDER — LISINOPRIL 10 MG/1
10 TABLET ORAL DAILY
Qty: 90 TABLET | Refills: 3 | Status: SHIPPED | OUTPATIENT
Start: 2019-09-04 | End: 2020-08-27

## 2019-09-04 ASSESSMENT — MIFFLIN-ST. JEOR: SCORE: 1470.77

## 2019-09-04 NOTE — LETTER
September 4, 2019       TO: Samuel Enriquez   [address] [facility] [address]       Dear Mr. Enriquez,    The results of your recent lipid panel are below, as well as the last lipid panel we have on file from 2016 for comparison.    Component      Latest Ref Rng & Units 9/4/2019   Cholesterol      <200 mg/dL 135   Triglycerides      <150 mg/dL 55   HDL Cholesterol      >39 mg/dL 49   LDL Cholesterol Calculated      <100 mg/dL 75   Non HDL Cholesterol      <130 mg/dL 86   ALT      5 - 30 U/L <5 (L)       Component      Latest Ref Rng & Units 10/25/2016   Cholesterol      <200 mg/dL 147   Triglycerides      <150 mg/dL 72   HDL Cholesterol      >39 mg/dL 52   LDL Cholesterol Calculated      <100 mg/dL 81   Non HDL Cholesterol      <130 mg/dL 95   ALT      5 - 30 U/L <5 (L)         Sincerely,    [facility]

## 2019-09-04 NOTE — PROGRESS NOTES
HPI and Plan:   I had the pleasure of seeing Samuel Enriquez today in cardiology clinic follow up. He is a pleasant 84 year old patient of Dr. Garcia.    Mr. Enriquez has a past medical history significant for coronary artery disease, hyperlipidemia, hypertension, and BPH.  The patient is status post three-vessel coronary artery bypass surgery in February 1999 utilizing the LIMA to LAD, left radial artery to diagonal, left radial artery to obtuse marginal 1.  He had 40 to 50% mid right coronary artery stenosis with collateral filling of the mid and distal LAD.  He had a normal left ventricular systolic function.  A nuclear stress test was performed on July 24, 2018 demonstrating no new areas of ischemia or infarction.  His ejection fraction was 62%.  This is unchanged from February 2011.    The patient recently underwent a TURP procedure due to urinary retention with bilateral hydronephrosis in May 2019.  Since the procedure he has last 7 pounds.  His urinalysis remains unremarkable with no signs of infection.  His creatinine is maintaining 1.0-1.2.    The patient remains quite active.  He is planning a trip to [place] with his wife in the next couple of months.  He continues to exercise 5 times a week for 30 minutes using an elliptical cross  as well as light weights with repetition.  He denies PND, orthopnea, shortness of breath, chest discomfort and peripheral edema.  He tells me he notes a significant an improvement in the epistaxis he would experience in [place].  The use of Vaseline in his nose has helped prevent further episodes.  He does continue to have mild bruising on his arms which is likely due to the aspirin therapy.  His blood pressure is under good control.  His most recent LDL was 75, total cholesterol 135, HDL 49, triglycerides 55.    Physical Exam  Please see Below     Assessment and Plan  1.  Coronary artery disease status post three-vessel CABG in 1998.  He remains angina free most  recent nuclear stress test in 2018 demonstrated no ischemia.  He remains active and eats a healthy diet.  I have asked him to continue aspirin, statin therapy and lisinopril.    2.  Borderline systolic hypertension in the past.  Currently well controlled.  Continue current dose of lisinopril.    3. Hyperlipidemia.  LDL 75.  Continue atorvastatin.    Thank you for allowing me to care for Samuel Enriquez today.    JORGE LUIS Carreon, CNP  Cardiology    Voice recognition software was used for this note, I have reviewed this note, but errors may have been missed.    No orders of the defined types were placed in this encounter.    Orders Placed This Encounter   Medications     atorvastatin (LIPITOR) 10 MG tablet     Sig: Take 1 tablet (10 mg) by mouth daily     Dispense:  90 tablet     Refill:  3     lisinopril (PRINIVIL/ZESTRIL) 10 MG tablet     Sig: Take 1 tablet (10 mg) by mouth daily     Dispense:  90 tablet     Refill:  3     Medications Discontinued During This Encounter   Medication Reason     atorvastatin (LIPITOR) 10 MG tablet Reorder     lisinopril (PRINIVIL/ZESTRIL) 10 MG tablet Reorder         CURRENT MEDICATIONS:  Current Outpatient Medications   Medication Sig Dispense Refill     ASPIRIN PO Take 81 mg by mouth        atorvastatin (LIPITOR) 10 MG tablet Take 1 tablet (10 mg) by mouth daily 90 tablet 3     DiphenhydrAMINE HCl (ALLERGY MEDICATION PO) Take 1 tablet by mouth as needed       lisinopril (PRINIVIL/ZESTRIL) 10 MG tablet Take 1 tablet (10 mg) by mouth daily 90 tablet 3       ALLERGIES   No Known Allergies    PAST MEDICAL HISTORY:  Past Medical History:   Diagnosis Date     Atrial fibrillation (H)      BPH (benign prostatic hyperplasia)      CAD (coronary artery disease)      Epistaxis      HLD (hyperlipidaemia)      HTN (hypertension)      Hydronephrosis      S/P CABG x 3 1998    LIMA to LAD, left radial artery to the diagonal and first obtuse marginal branch arteries     S/P TURP 05/29/2019    Per  [facility](Brayan Radha)       PAST SURGICAL HISTORY:  Past Surgical History:   Procedure Laterality Date     BYPASS CARDIOPULMONARY  1999     CORONARY ANGIOGRAPHY ADULT ORDER  2/1998 2/1998: Multivessel disease, referred for CABG     CORONARY ARTERY BYPASS  2/1998 2/1998: LIMA to LAD, Left radial to diagonal and OM1       FAMILY HISTORY:  Family History   Problem Relation Age of Onset     Cancer Mother        SOCIAL HISTORY:  Social History     Socioeconomic History     Marital status:      Spouse name: None     Number of children: None     Years of education: None     Highest education level: None   Occupational History     None   Social Needs     Financial resource strain: None     Food insecurity:     Worry: None     Inability: None     Transportation needs:     Medical: None     Non-medical: None   Tobacco Use     Smoking status: Never Smoker     Smokeless tobacco: Never Used   Substance and Sexual Activity     Alcohol use: Yes     Comment: social     Drug use: No     Sexual activity: None   Lifestyle     Physical activity:     Days per week: None     Minutes per session: None     Stress: None   Relationships     Social connections:     Talks on phone: None     Gets together: None     Attends Shinto service: None     Active member of club or organization: None     Attends meetings of clubs or organizations: None     Relationship status: None     Intimate partner violence:     Fear of current or ex partner: None     Emotionally abused: None     Physically abused: None     Forced sexual activity: None   Other Topics Concern     Parent/sibling w/ CABG, MI or angioplasty before 65F 55M? Not Asked      Service Not Asked     Blood Transfusions Not Asked     Caffeine Concern No     Comment: some coffee     Occupational Exposure Not Asked     Hobby Hazards Not Asked     Sleep Concern Not Asked     Stress Concern Not Asked     Weight Concern Not Asked     Special Diet Not Asked  "    Back Care Not Asked     Exercise Yes     Comment: walking outside in nice weather. DVDs/ Wii     Bike Helmet Not Asked     Seat Belt Yes     Self-Exams Not Asked   Social History Narrative     None       Review of Systems:  Skin:  Negative       Eyes:  Negative      ENT:  Negative      Respiratory:  Negative       Cardiovascular:  Negative      Gastroenterology: Negative      Genitourinary:  not assessed      Musculoskeletal:  Negative      Neurologic:  Negative      Psychiatric:  Negative      Heme/Lymph/Imm:  Positive for allergies    Endocrine:  Negative        Physical Exam:  Vitals: /67   Pulse 75   Ht 1.842 m (6' 0.5\")   Wt 73.5 kg (162 lb)   BMI 21.67 kg/m       Constitutional:  cooperative, alert and oriented, well developed, well nourished, in no acute distress        Skin:  warm and dry to the touch, no apparent skin lesions or masses noted          Head:  normocephalic, no masses or lesions        Eyes:  pupils equal and round, conjunctivae and lids unremarkable, sclera white, no xanthalasma, EOMS intact, no nystagmus        Lymph:      ENT:  no pallor or cyanosis, dentition good        Neck:  carotid pulses are full and equal bilaterally, JVP normal, no carotid bruit        Respiratory:  normal breath sounds, clear to auscultation, normal A-P diameter, normal symmetry, normal respiratory excursion, no use of accessory muscles;healed median sternotomy scar         Cardiac: regular rhythm;normal S1 and S2;apical impulse not displaced   S4 no presence of murmur          pulses full and equal, no bruits auscultated                                        GI:  abdomen soft;non-tender        Extremities and Muscular Skeletal:  no deformities, clubbing, cyanosis, erythema observed;no edema         left radial artery harvest scar is well healed with normal pulse to his hand    Neurological:  no gross motor deficits;affect appropriate        Psych:  Alert and Oriented x 3    Encounter Diagnoses "   Name Primary?     Pure hypercholesterolemia      Coronary artery disease involving native coronary artery of native heart without angina pectoris        Recent Lab Results:  LIPID RESULTS:  Lab Results   Component Value Date    CHOL 135 09/04/2019    HDL 49 09/04/2019    LDL 75 09/04/2019    TRIG 55 09/04/2019    CHOLHDLRATIO 2.9 04/15/2015       LIVER ENZYME RESULTS:  Lab Results   Component Value Date    ALT <5 (L) 09/04/2019       CBC RESULTS:  No results found for: WBC, RBC, HGB, HCT, MCV, MCH, MCHC, RDW, PLT    BMP RESULTS:  No results found for: NA, POTASSIUM, CHLORIDE, CO2, ANIONGAP, GLC, BUN, CR, GFRESTIMATED, GFRESTBLACK, PRESTON     A1C RESULTS:  No results found for: A1C    INR RESULTS:  No results found for: INR        CC  No referring provider defined for this encounter.

## 2019-09-04 NOTE — TELEPHONE ENCOUNTER
VM from patient, stating that he would like his lipid results from today compared to his previous results sent to him via email. Letter printed to give to patient. Attempted to call back patient. Patient did not answer. Left message stating that the results can be mailed to him or can be picked up in the clinic, but cannot be emailed. Instructed patient to call back with which option he would like.

## 2019-09-04 NOTE — LETTER
9/4/2019    [facility] [place] [address]    [place] [address]    RE: Samuel ASTUDILLO Alcchad       Dear Colleague,    I had the pleasure of seeing Samuel Enriquez in the [facility].    HPI and Plan:   I had the pleasure of seeing Samuel Enriquez today in cardiology clinic follow up. He is a pleasant 84 year old patient of Dr. Garcia.    Mr. Enriquez has a past medical history significant for coronary artery disease, hyperlipidemia, hypertension, and BPH.  The patient is status post three-vessel coronary artery bypass surgery in February 1999 utilizing the LIMA to LAD, left radial artery to diagonal, left radial artery to obtuse marginal 1.  He had 40 to 50% mid right coronary artery stenosis with collateral filling of the mid and distal LAD.  He had a normal left ventricular systolic function.  A nuclear stress test was performed on July 24, 2018 demonstrating no new areas of ischemia or infarction.  His ejection fraction was 62%.  This is unchanged from February 2011.    The patient recently underwent a TURP procedure due to urinary retention with bilateral hydronephrosis in May 2019.  Since the procedure he has last 7 pounds.  His urinalysis remains unremarkable with no signs of infection.  His creatinine is maintaining 1.0-1.2.    The patient remains quite active.  He is planning a trip to [place] with his wife in the next couple of months.  He continues to exercise 5 times a week for 30 minutes using an elliptical cross  as well as light weights with repetition.  He denies PND, orthopnea, shortness of breath, chest discomfort and peripheral edema.  He tells me he notes a significant an improvement in the epistaxis he would experience in [place].  The use of Vaseline in his nose has helped prevent further episodes.  He does continue to have mild bruising on his arms which is likely due to the aspirin therapy.  His blood pressure is under  good control.  His most recent LDL was 75, total cholesterol 135, HDL 49, triglycerides 55.    Physical Exam  Please see Below     Assessment and Plan  1.  Coronary artery disease status post three-vessel CABG in 1998.  He remains angina free most recent nuclear stress test in 2018 demonstrated no ischemia.  He remains active and eats a healthy diet.  I have asked him to continue aspirin, statin therapy and lisinopril.    2.  Borderline systolic hypertension in the past.  Currently well controlled.  Continue current dose of lisinopril.    3. Hyperlipidemia.  LDL 75.  Continue atorvastatin.    Thank you for allowing me to care for Samuel Enriquez today.    JORGE LUIS Carreon, CNP  Cardiology    Voice recognition software was used for this note, I have reviewed this note, but errors may have been missed.    No orders of the defined types were placed in this encounter.    Orders Placed This Encounter   Medications     atorvastatin (LIPITOR) 10 MG tablet     Sig: Take 1 tablet (10 mg) by mouth daily     Dispense:  90 tablet     Refill:  3     lisinopril (PRINIVIL/ZESTRIL) 10 MG tablet     Sig: Take 1 tablet (10 mg) by mouth daily     Dispense:  90 tablet     Refill:  3     Medications Discontinued During This Encounter   Medication Reason     atorvastatin (LIPITOR) 10 MG tablet Reorder     lisinopril (PRINIVIL/ZESTRIL) 10 MG tablet Reorder         CURRENT MEDICATIONS:  Current Outpatient Medications   Medication Sig Dispense Refill     ASPIRIN PO Take 81 mg by mouth        atorvastatin (LIPITOR) 10 MG tablet Take 1 tablet (10 mg) by mouth daily 90 tablet 3     DiphenhydrAMINE HCl (ALLERGY MEDICATION PO) Take 1 tablet by mouth as needed       lisinopril (PRINIVIL/ZESTRIL) 10 MG tablet Take 1 tablet (10 mg) by mouth daily 90 tablet 3       ALLERGIES   No Known Allergies    PAST MEDICAL HISTORY:  Past Medical History:   Diagnosis Date     Atrial fibrillation (H)      BPH (benign prostatic hyperplasia)      CAD (coronary  artery disease)      Epistaxis      HLD (hyperlipidaemia)      HTN (hypertension)      Hydronephrosis      S/P CABG x 3 1998    LIMA to LAD, left radial artery to the diagonal and first obtuse marginal branch arteries     S/P TURP 05/29/2019    Per [facility](Brayan Garcia)       PAST SURGICAL HISTORY:  Past Surgical History:   Procedure Laterality Date     BYPASS CARDIOPULMONARY  1999     CORONARY ANGIOGRAPHY ADULT ORDER  2/1998 2/1998: Multivessel disease, referred for CABG     CORONARY ARTERY BYPASS  2/1998 2/1998: LIMA to LAD, Left radial to diagonal and OM1       FAMILY HISTORY:  Family History   Problem Relation Age of Onset     Cancer Mother        SOCIAL HISTORY:  Social History     Socioeconomic History     Marital status:      Spouse name: None     Number of children: None     Years of education: None     Highest education level: None   Occupational History     None   Social Needs     Financial resource strain: None     Food insecurity:     Worry: None     Inability: None     Transportation needs:     Medical: None     Non-medical: None   Tobacco Use     Smoking status: Never Smoker     Smokeless tobacco: Never Used   Substance and Sexual Activity     Alcohol use: Yes     Comment: social     Drug use: No     Sexual activity: None   Lifestyle     Physical activity:     Days per week: None     Minutes per session: None     Stress: None   Relationships     Social connections:     Talks on phone: None     Gets together: None     Attends Sikh service: None     Active member of club or organization: None     Attends meetings of clubs or organizations: None     Relationship status: None     Intimate partner violence:     Fear of current or ex partner: None     Emotionally abused: None     Physically abused: None     Forced sexual activity: None   Other Topics Concern     Parent/sibling w/ CABG, MI or angioplasty before 65F 55M? Not Asked      Service Not Asked     Blood  "Transfusions Not Asked     Caffeine Concern No     Comment: some coffee     Occupational Exposure Not Asked     Hobby Hazards Not Asked     Sleep Concern Not Asked     Stress Concern Not Asked     Weight Concern Not Asked     Special Diet Not Asked     Back Care Not Asked     Exercise Yes     Comment: walking outside in nice weather. DVDs/ Wii     Bike Helmet Not Asked     Seat Belt Yes     Self-Exams Not Asked   Social History Narrative     None       Review of Systems:  Skin:  Negative       Eyes:  Negative      ENT:  Negative      Respiratory:  Negative       Cardiovascular:  Negative      Gastroenterology: Negative      Genitourinary:  not assessed      Musculoskeletal:  Negative      Neurologic:  Negative      Psychiatric:  Negative      Heme/Lymph/Imm:  Positive for allergies    Endocrine:  Negative        Physical Exam:  Vitals: /67   Pulse 75   Ht 1.842 m (6' 0.5\")   Wt 73.5 kg (162 lb)   BMI 21.67 kg/m       Constitutional:  cooperative, alert and oriented, well developed, well nourished, in no acute distress        Skin:  warm and dry to the touch, no apparent skin lesions or masses noted          Head:  normocephalic, no masses or lesions        Eyes:  pupils equal and round, conjunctivae and lids unremarkable, sclera white, no xanthalasma, EOMS intact, no nystagmus        Lymph:      ENT:  no pallor or cyanosis, dentition good        Neck:  carotid pulses are full and equal bilaterally, JVP normal, no carotid bruit        Respiratory:  normal breath sounds, clear to auscultation, normal A-P diameter, normal symmetry, normal respiratory excursion, no use of accessory muscles;healed median sternotomy scar         Cardiac: regular rhythm;normal S1 and S2;apical impulse not displaced   S4 no presence of murmur          pulses full and equal, no bruits auscultated                                        GI:  abdomen soft;non-tender        Extremities and Muscular Skeletal:  no deformities, clubbing, " cyanosis, erythema observed;no edema         left radial artery harvest scar is well healed with normal pulse to his hand    Neurological:  no gross motor deficits;affect appropriate        Psych:  Alert and Oriented x 3    Encounter Diagnoses   Name Primary?     Pure hypercholesterolemia      Coronary artery disease involving native coronary artery of native heart without angina pectoris        Recent Lab Results:  LIPID RESULTS:  Lab Results   Component Value Date    CHOL 135 09/04/2019    HDL 49 09/04/2019    LDL 75 09/04/2019    TRIG 55 09/04/2019    CHOLHDLRATIO 2.9 04/15/2015       LIVER ENZYME RESULTS:  Lab Results   Component Value Date    ALT <5 (L) 09/04/2019       CBC RESULTS:  No results found for: WBC, RBC, HGB, HCT, MCV, MCH, MCHC, RDW, PLT    BMP RESULTS:  No results found for: NA, POTASSIUM, CHLORIDE, CO2, ANIONGAP, GLC, BUN, CR, GFRESTIMATED, GFRESTBLACK, PRESTON     A1C RESULTS:  No results found for: A1C    INR RESULTS:  No results found for: INR          Thank you for allowing me to participate in the care of your patient.    Sincerely,     JORGE LUIS Carreon [facility]

## 2019-09-05 DIAGNOSIS — E78.2 MIXED HYPERLIPIDEMIA: Primary | ICD-10-CM

## 2020-08-27 ENCOUNTER — TELEPHONE (OUTPATIENT)
Dept: CARDIOLOGY | Facility: CLINIC | Age: 85
End: 2020-08-27

## 2020-08-27 DIAGNOSIS — I25.10 CORONARY ARTERY DISEASE INVOLVING NATIVE CORONARY ARTERY OF NATIVE HEART WITHOUT ANGINA PECTORIS: ICD-10-CM

## 2020-08-27 DIAGNOSIS — E78.00 PURE HYPERCHOLESTEROLEMIA: ICD-10-CM

## 2020-08-27 RX ORDER — ATORVASTATIN CALCIUM 10 MG/1
10 TABLET, FILM COATED ORAL DAILY
Qty: 90 TABLET | Refills: 1 | Status: SHIPPED | OUTPATIENT
Start: 2020-08-27 | End: 2021-01-14

## 2020-08-27 RX ORDER — LISINOPRIL 10 MG/1
10 TABLET ORAL DAILY
Qty: 90 TABLET | Refills: 1 | Status: SHIPPED | OUTPATIENT
Start: 2020-08-27 | End: 2020-11-05

## 2020-08-28 NOTE — TELEPHONE ENCOUNTER
"Call from patient, wondering the status of his refills that he called about yesterday. Spoke with patient and reviewed at a temporary prescription has been filled for him, but he is due for an annual follow up. Patient then became upset and said that he is not coming in for an annual visit, as he is at risk due to COVID, and needs a year worth of his medications. Reviewed with patient that per our protocol, patients need to be seen on an annual basis to have medications refilled. Offered to patient that we offer virtual visits during this time as well. Patient again became upset and said that \"all you're going to do is ask me how I'm feeling and I'm feeling fine, I am not doing a visit, I just want my medications.\" Patient ended call by saying \"I except that you will take care of this for me, I need a year's worth of medications no questions asked.\" Will route to Dr. Balderas for review in Dr. Garcia's absence.   "

## 2020-11-05 DIAGNOSIS — I25.10 CORONARY ARTERY DISEASE INVOLVING NATIVE CORONARY ARTERY OF NATIVE HEART WITHOUT ANGINA PECTORIS: ICD-10-CM

## 2020-11-05 RX ORDER — LISINOPRIL 10 MG/1
10 TABLET ORAL DAILY
Qty: 90 TABLET | Refills: 0 | Status: SHIPPED | OUTPATIENT
Start: 2020-11-05 | End: 2021-01-14

## 2021-01-14 DIAGNOSIS — E78.00 PURE HYPERCHOLESTEROLEMIA: ICD-10-CM

## 2021-01-14 DIAGNOSIS — I25.10 CORONARY ARTERY DISEASE INVOLVING NATIVE CORONARY ARTERY OF NATIVE HEART WITHOUT ANGINA PECTORIS: ICD-10-CM

## 2021-01-14 RX ORDER — ATORVASTATIN CALCIUM 10 MG/1
10 TABLET, FILM COATED ORAL DAILY
Qty: 90 TABLET | Refills: 1 | Status: SHIPPED | OUTPATIENT
Start: 2021-01-14 | End: 2021-06-25

## 2021-01-14 RX ORDER — LISINOPRIL 10 MG/1
10 TABLET ORAL DAILY
Qty: 90 TABLET | Refills: 1 | Status: SHIPPED | OUTPATIENT
Start: 2021-01-14 | End: 2021-06-25

## 2021-01-14 NOTE — TELEPHONE ENCOUNTER
LOV 9/4/19. RN refilled rx per RN refill protocol. Last seen cardiologist 8/2/18.    RN will refille for 90 days patient will need video visit for further refills. Message to pharmacy.

## 2021-06-25 DIAGNOSIS — I25.10 CORONARY ARTERY DISEASE INVOLVING NATIVE CORONARY ARTERY OF NATIVE HEART WITHOUT ANGINA PECTORIS: ICD-10-CM

## 2021-06-25 DIAGNOSIS — E78.00 PURE HYPERCHOLESTEROLEMIA: ICD-10-CM

## 2021-06-25 RX ORDER — LISINOPRIL 10 MG/1
10 TABLET ORAL DAILY
Qty: 90 TABLET | Refills: 1 | Status: SHIPPED | OUTPATIENT
Start: 2021-06-25 | End: 2021-08-24

## 2021-06-25 RX ORDER — ATORVASTATIN CALCIUM 10 MG/1
10 TABLET, FILM COATED ORAL DAILY
Qty: 90 TABLET | Refills: 1 | Status: SHIPPED | OUTPATIENT
Start: 2021-06-25 | End: 2021-08-24

## 2021-08-11 DIAGNOSIS — E78.5 HYPERLIPIDEMIA LDL GOAL <100: ICD-10-CM

## 2021-08-11 DIAGNOSIS — E78.00 PURE HYPERCHOLESTEROLEMIA: Primary | ICD-10-CM

## 2021-08-19 ENCOUNTER — LAB (OUTPATIENT)
Dept: LAB | Facility: CLINIC | Age: 86
End: 2021-08-19
Attending: INTERNAL MEDICINE
Payer: MEDICARE

## 2021-08-19 DIAGNOSIS — E78.5 HYPERLIPIDEMIA LDL GOAL <100: ICD-10-CM

## 2021-08-19 DIAGNOSIS — E78.00 PURE HYPERCHOLESTEROLEMIA: ICD-10-CM

## 2021-08-19 LAB
ALT SERPL W P-5'-P-CCNC: 23 U/L (ref 0–70)
CHOLEST SERPL-MCNC: 157 MG/DL
FASTING STATUS PATIENT QL REPORTED: YES
HDLC SERPL-MCNC: 54 MG/DL
LDLC SERPL CALC-MCNC: 89 MG/DL
NONHDLC SERPL-MCNC: 103 MG/DL
TRIGL SERPL-MCNC: 71 MG/DL

## 2021-08-19 PROCEDURE — 36415 COLL VENOUS BLD VENIPUNCTURE: CPT | Performed by: INTERNAL MEDICINE

## 2021-08-19 PROCEDURE — 80061 LIPID PANEL: CPT | Performed by: INTERNAL MEDICINE

## 2021-08-19 PROCEDURE — 84460 ALANINE AMINO (ALT) (SGPT): CPT | Performed by: INTERNAL MEDICINE

## 2021-08-24 ENCOUNTER — OFFICE VISIT (OUTPATIENT)
Dept: CARDIOLOGY | Facility: CLINIC | Age: 86
End: 2021-08-24
Payer: MEDICARE

## 2021-08-24 VITALS
BODY MASS INDEX: 21.95 KG/M2 | WEIGHT: 165.6 LBS | HEART RATE: 76 BPM | SYSTOLIC BLOOD PRESSURE: 138 MMHG | DIASTOLIC BLOOD PRESSURE: 78 MMHG | HEIGHT: 73 IN

## 2021-08-24 DIAGNOSIS — E78.00 PURE HYPERCHOLESTEROLEMIA: ICD-10-CM

## 2021-08-24 DIAGNOSIS — I25.10 CORONARY ARTERY DISEASE INVOLVING NATIVE CORONARY ARTERY OF NATIVE HEART WITHOUT ANGINA PECTORIS: Primary | ICD-10-CM

## 2021-08-24 PROCEDURE — 93000 ELECTROCARDIOGRAM COMPLETE: CPT | Performed by: INTERNAL MEDICINE

## 2021-08-24 PROCEDURE — 99214 OFFICE O/P EST MOD 30 MIN: CPT | Performed by: INTERNAL MEDICINE

## 2021-08-24 RX ORDER — LISINOPRIL 10 MG/1
10 TABLET ORAL DAILY
Qty: 90 TABLET | Refills: 3 | Status: SHIPPED | OUTPATIENT
Start: 2021-08-24 | End: 2022-01-24

## 2021-08-24 RX ORDER — ATORVASTATIN CALCIUM 10 MG/1
10 TABLET, FILM COATED ORAL AT BEDTIME
Qty: 90 TABLET | Refills: 3 | Status: SHIPPED | OUTPATIENT
Start: 2021-08-24 | End: 2022-03-14

## 2021-08-24 RX ORDER — NITROGLYCERIN 0.4 MG/1
TABLET SUBLINGUAL
Qty: 30 TABLET | Refills: 3 | Status: SHIPPED | OUTPATIENT
Start: 2021-08-24

## 2021-08-24 ASSESSMENT — MIFFLIN-ST. JEOR: SCORE: 1477.1

## 2021-08-24 NOTE — PROGRESS NOTES
Cardiology Clinic Progress Note:    August 24, 2021   Patient Name: Samuel Enriquez  Patient MRN: [id number]     Consult indication: CAD    HPI:    I had the opportunity to see patient Samuel Enriquez in cardiology clinic for a follow up visit. Patient is followed by our colleague Katerina Gonzalez MD with Primary Care.     As you know, patient is a pleasant 86-year-old retired  with a past medical history significant for coronary artery disease status post CABG, hypertension, hyperlipidemia, BPH, presents for routine follow-up.    Patient underwent coronary artery bypass surgery 2/1999 with LIMA to LAD, left radial to diagonal and OM, he had a 40 to 50% mid RCA stenosis with contralateral collaterals filling the mid to distal LAD.  Last ischemic evaluation was a nuclear stress test 7/2018 that was negative for ischemia or infarction.  LVEF 62%.    At baseline, patient is extremely physically active.  He exercises 5 days a week for 30 minutes or more using light weight lifting, aerobic exercises, as well as stretching.  He and his wife are avid travelers, and are planning to go on a cruise in a few weeks.  He is retired , and is currently involved with leadership coaching.    Last cholesterol labs from 8/2021 are notable for a total cholesterol 157, HDL 54, LDL 89, triglycerides 71.    Blood pressure today in clinic was 138/70 8 mmHg, however at home blood pressures are lower.    Assessment and Plan/Recommendations:    # CAD s/p coronary artery bypass surgery 2/1999 with LIMA to LAD, left radial to diagonal and OM, he had a 40 to 50% mid RCA stenosis with contralateral collaterals filling the mid to distal LAD.  Stable.  # HTN. BP well controlled.   # HL. On statin therapy  # Post CABG paroxysmal atrial fibrillation, no evidence of recurrence since then    -Overall patient is in stable cardiovascular health without symptoms concerning for angina or decompensated heart  failure  -We discussed his prior diagnosis of postop atrial fibrillation, offered reassessment with cardiac monitoring, patient would like to hold off for now, which I feel is reasonable since he has not had any evidence of recurrence since then  -Continue current cardiac regimen of aspirin 81 mg daily, lisinopril 10 mg daily, atorvastatin 10 mg nightly  -Sublingual nitroglycerin as needed  -Follow-up in 1 year with repeat nuclear stress test, and fasting lipids at that time, or sooner as needed    Thank you for allowing our team to participate in the care of Samuel Enriquez.  Please do not hesitate to call or page me with any questions or concerns.    Sincerely,     Chaz Evans MD, [facility]  Cardiology  Text Page   August 24, 2021    Voice recognition software utilized. Although reviewed after completion, some word and grammatical errors may be present.    Total time spent on this encounter: 30 minutes, providing care in this encounter including, but not limited to, reviewing prior medical records, laboratory data, imaging studies, diagnostic studies, procedure notes, formulating an assessment and plan, recommendations.    Past Medical History:     Past Medical History:   Diagnosis Date     Atrial fibrillation (H)      BPH (benign prostatic hyperplasia)      CAD (coronary artery disease)      Epistaxis      HLD (hyperlipidaemia)      HTN (hypertension)      Hydronephrosis      S/P CABG x 3 1998    LIMA to LAD, left radial artery to the diagonal and first obtuse marginal branch arteries     S/P TURP 05/29/2019    Per [facility](Brayan Garcia)        Past Surgical History:   Past Surgical History:   Procedure Laterality Date     BYPASS CARDIOPULMONARY  1999     CORONARY ANGIOGRAPHY ADULT ORDER  2/1998 2/1998: Multivessel disease, referred for CABG     CORONARY ARTERY BYPASS  2/1998 2/1998: LIMA to LAD, Left radial to diagonal and OM1       Medications (outpatient):  Current  "Outpatient Medications   Medication Sig Dispense Refill     ASPIRIN PO Take 81 mg by mouth        atorvastatin (LIPITOR) 10 MG tablet Take 1 tablet (10 mg) by mouth At Bedtime 90 tablet 3     DiphenhydrAMINE HCl (ALLERGY MEDICATION PO) Take 1 tablet by mouth as needed       lisinopril (ZESTRIL) 10 MG tablet Take 1 tablet (10 mg) by mouth daily 90 tablet 3     nitroGLYcerin (NITROSTAT) 0.4 MG sublingual tablet For chest pain place 1 tablet under the tongue every 5 minutes for 3 doses. If symptoms persist 5 minutes after 1st dose call 911. 30 tablet 3       Allergies:  No Known Allergies    Social History:   History   Drug Use No      History   Smoking Status     Never Smoker   Smokeless Tobacco     Never Used     Social History    Substance and Sexual Activity      Alcohol use: Yes        Comment: social       Family History:  Family History   Problem Relation Age of Onset     Cancer Mother        Review of Systems:   A complete review of systems was negative except as mentioned in the History of Present Illness.     Objective & Physical Exam:  /78   Pulse 76   Ht 1.842 m (6' 0.5\")   Wt 75.1 kg (165 lb 9.6 oz)   BMI 22.15 kg/m    Wt Readings from Last 2 Encounters:   08/24/21 75.1 kg (165 lb 9.6 oz)   09/04/19 73.5 kg (162 lb)     Body mass index is 22.15 kg/m .   Body surface area is 1.96 meters squared.    Constitutional: appears stated age, in no apparent distress, appears to be well nourished  Eyes: sclera anicteric, conjunctiva normal  ENT: normocephalic, without obvious abnormality, atraumatic  Pulmonary: clear to auscultation bilaterally, no wheezes, no rales, no increased work of breathing  Cardiovascular: JVP normal, regular rate, regular rhythm, 1/6 ERA at the RUSB, no lower extremity edema  Gastrointestinal: abdominal exam benign  Neurologic: awake, alert, face symmetrical, moves all extremities  Skin: no jaundice  Psychiatric: affect is normal, answers questions appropriately, oriented to self " and place    Data reviewed:    ALT   Date Value Ref Range Status   08/19/2021 23 0 - 70 U/L Final   09/04/2019 <5 (L) 5 - 30 U/L Final     Recent Labs   Lab Test 08/19/21  0747 09/04/19  0729 04/15/15  0846 03/05/14  0000 03/05/14  0000   CHOL 157 135 152   < >  --    HDL 54 49 52  --  59   LDL 89 75 85  --  70   TRIG 71 55 73  --  55   CHOLHDLRATIO  --   --  2.9  --  2.4    < > = values in this interval not displayed.

## 2021-08-24 NOTE — LETTER
8/24/2021    [facility] [address]    [facility] [address]    RE: Samuel Enriquez       Dear Colleague,    I had the pleasure of seeing Samuel Enriquez in the [facility].        Cardiology Clinic Progress Note:    August 24, 2021   Patient Name: Samuel Enriquez  Patient MRN: [id number]     Consult indication: CAD    HPI:    I had the opportunity to see patient Samuel Enriquez in cardiology clinic for a follow up visit. Patient is followed by our colleague Katerina Gonzalez MD with Primary Care.     As you know, patient is a pleasant 86-year-old retired  with a past medical history significant for coronary artery disease status post CABG, hypertension, hyperlipidemia, BPH, presents for routine follow-up.    Patient underwent coronary artery bypass surgery 2/1999 with LIMA to LAD, left radial to diagonal and OM, he had a 40 to 50% mid RCA stenosis with contralateral collaterals filling the mid to distal LAD.  Last ischemic evaluation was a nuclear stress test 7/2018 that was negative for ischemia or infarction.  LVEF 62%.    At baseline, patient is extremely physically active.  He exercises 5 days a week for 30 minutes or more using light weight lifting, aerobic exercises, as well as stretching.  He and his wife are avid travelers, and are planning to go on a cruise in a few weeks.  He is retired , and is currently involved with leadership coaching.    Last cholesterol labs from 8/2021 are notable for a total cholesterol 157, HDL 54, LDL 89, triglycerides 71.    Blood pressure today in clinic was 138/70 8 mmHg, however at home blood pressures are lower.    Assessment and Plan/Recommendations:    # CAD s/p coronary artery bypass surgery 2/1999 with LIMA to LAD, left radial to diagonal and OM, he had a 40 to 50% mid RCA stenosis with contralateral collaterals filling the mid  to distal LAD.  Stable.  # HTN. BP well controlled.   # HL. On statin therapy  # Post CABG paroxysmal atrial fibrillation, no evidence of recurrence since then    -Overall patient is in stable cardiovascular health without symptoms concerning for angina or decompensated heart failure  -We discussed his prior diagnosis of postop atrial fibrillation, offered reassessment with cardiac monitoring, patient would like to hold off for now, which I feel is reasonable since he has not had any evidence of recurrence since then  -Continue current cardiac regimen of aspirin 81 mg daily, lisinopril 10 mg daily, atorvastatin 10 mg nightly  -Sublingual nitroglycerin as needed  -Follow-up in 1 year with repeat nuclear stress test, and fasting lipids at that time, or sooner as needed    Thank you for allowing our team to participate in the care of Samuel Enriquez.  Please do not hesitate to call or page me with any questions or concerns.    Sincerely,     Chaz Evans MD, [facility]  Cardiology  Text Page   August 24, 2021    Voice recognition software utilized. Although reviewed after completion, some word and grammatical errors may be present.    Total time spent on this encounter: 30 minutes, providing care in this encounter including, but not limited to, reviewing prior medical records, laboratory data, imaging studies, diagnostic studies, procedure notes, formulating an assessment and plan, recommendations.    Past Medical History:     Past Medical History:   Diagnosis Date     Atrial fibrillation (H)      BPH (benign prostatic hyperplasia)      CAD (coronary artery disease)      Epistaxis      HLD (hyperlipidaemia)      HTN (hypertension)      Hydronephrosis      S/P CABG x 3 1998    LIMA to LAD, left radial artery to the diagonal and first obtuse marginal branch arteries     S/P TURP 05/29/2019    Per [facility](Brayan Garcia)        Past Surgical History:   Past Surgical History:   Procedure  "Laterality Date     BYPASS CARDIOPULMONARY  1999     CORONARY ANGIOGRAPHY ADULT ORDER  2/1998 2/1998: Multivessel disease, referred for CABG     CORONARY ARTERY BYPASS  2/1998 2/1998: LIMA to LAD, Left radial to diagonal and OM1       Medications (outpatient):  Current Outpatient Medications   Medication Sig Dispense Refill     ASPIRIN PO Take 81 mg by mouth        atorvastatin (LIPITOR) 10 MG tablet Take 1 tablet (10 mg) by mouth At Bedtime 90 tablet 3     DiphenhydrAMINE HCl (ALLERGY MEDICATION PO) Take 1 tablet by mouth as needed       lisinopril (ZESTRIL) 10 MG tablet Take 1 tablet (10 mg) by mouth daily 90 tablet 3     nitroGLYcerin (NITROSTAT) 0.4 MG sublingual tablet For chest pain place 1 tablet under the tongue every 5 minutes for 3 doses. If symptoms persist 5 minutes after 1st dose call 911. 30 tablet 3       Allergies:  No Known Allergies    Social History:   History   Drug Use No      History   Smoking Status     Never Smoker   Smokeless Tobacco     Never Used     Social History    Substance and Sexual Activity      Alcohol use: Yes        Comment: social       Family History:  Family History   Problem Relation Age of Onset     Cancer Mother        Review of Systems:   A complete review of systems was negative except as mentioned in the History of Present Illness.     Objective & Physical Exam:  /78   Pulse 76   Ht 1.842 m (6' 0.5\")   Wt 75.1 kg (165 lb 9.6 oz)   BMI 22.15 kg/m    Wt Readings from Last 2 Encounters:   08/24/21 75.1 kg (165 lb 9.6 oz)   09/04/19 73.5 kg (162 lb)     Body mass index is 22.15 kg/m .   Body surface area is 1.96 meters squared.    Constitutional: appears stated age, in no apparent distress, appears to be well nourished  Eyes: sclera anicteric, conjunctiva normal  ENT: normocephalic, without obvious abnormality, atraumatic  Pulmonary: clear to auscultation bilaterally, no wheezes, no rales, no increased work of breathing  Cardiovascular: JVP normal, regular " rate, regular rhythm, 1/6 ERA at the RUSB, no lower extremity edema  Gastrointestinal: abdominal exam benign  Neurologic: awake, alert, face symmetrical, moves all extremities  Skin: no jaundice  Psychiatric: affect is normal, answers questions appropriately, oriented to self and place    Data reviewed:    ALT   Date Value Ref Range Status   08/19/2021 23 0 - 70 U/L Final   09/04/2019 <5 (L) 5 - 30 U/L Final     Recent Labs   Lab Test 08/19/21  0747 09/04/19  0729 04/15/15  0846 03/05/14  0000 03/05/14  0000   CHOL 157 135 152   < >  --    HDL 54 49 52  --  59   LDL 89 75 85  --  70   TRIG 71 55 73  --  55   CHOLHDLRATIO  --   --  2.9  --  2.4    < > = values in this interval not displayed.          Thank you for allowing me to participate in the care of your patient.      Sincerely,     Chaz Evans MD     [facility]  cc:   No referring provider defined for this encounter.

## 2021-09-04 ENCOUNTER — HEALTH MAINTENANCE LETTER (OUTPATIENT)
Age: 86
End: 2021-09-04

## 2022-01-24 DIAGNOSIS — I25.10 CORONARY ARTERY DISEASE INVOLVING NATIVE CORONARY ARTERY OF NATIVE HEART WITHOUT ANGINA PECTORIS: ICD-10-CM

## 2022-01-24 RX ORDER — LISINOPRIL 10 MG/1
10 TABLET ORAL DAILY
Qty: 90 TABLET | Refills: 2 | Status: SHIPPED | OUTPATIENT
Start: 2022-01-24 | End: 2022-07-26

## 2022-03-14 DIAGNOSIS — E78.00 PURE HYPERCHOLESTEROLEMIA: ICD-10-CM

## 2022-03-14 RX ORDER — ATORVASTATIN CALCIUM 10 MG/1
10 TABLET, FILM COATED ORAL AT BEDTIME
Qty: 90 TABLET | Refills: 1 | Status: SHIPPED | OUTPATIENT
Start: 2022-03-14 | End: 2022-07-26

## 2022-07-26 DIAGNOSIS — E78.00 PURE HYPERCHOLESTEROLEMIA: ICD-10-CM

## 2022-07-26 DIAGNOSIS — I25.10 CORONARY ARTERY DISEASE INVOLVING NATIVE CORONARY ARTERY OF NATIVE HEART WITHOUT ANGINA PECTORIS: ICD-10-CM

## 2022-07-26 RX ORDER — LISINOPRIL 10 MG/1
10 TABLET ORAL DAILY
Qty: 90 TABLET | Refills: 0 | Status: SHIPPED | OUTPATIENT
Start: 2022-07-26

## 2022-07-26 RX ORDER — ATORVASTATIN CALCIUM 10 MG/1
10 TABLET, FILM COATED ORAL AT BEDTIME
Qty: 90 TABLET | Refills: 0 | Status: SHIPPED | OUTPATIENT
Start: 2022-07-26

## 2022-07-26 NOTE — TELEPHONE ENCOUNTER
Received refill request for:  Atorvastatin, Lisinopril  Last OV was: 21 Dr. Evans  Labs/EK21 Lipids/ALT, 20 BMP  F/U scheduled: No future appointments. Orders for 2022, letter sent 2022. Second letter sent.   Pharmacy: [facility] Card[facility] Refill Guideline reviewed.  Medication meets criteria for refill.    Irma Torres RN, BSN  22 at 8:42 AM

## 2022-07-26 NOTE — LETTER
July 26, 2022       TO: aSmuel Enriquez  [address] [facility] [address]       Dear Samuel Enriquez,    We recently received a call from your pharmacy requesting a refill of your medication(s).    Our records indicate that you are due for follow-up with your Heart Care Provider. We will refill your medications for 3 months which will allow you enough time to be seen.    Please call [phone] to schedule your appointment.    Thank you for allowing [facility] to be a part of your health care team and we look forward to seeing you soon.    Thank you,    [facility]

## 2022-10-22 ENCOUNTER — HEALTH MAINTENANCE LETTER (OUTPATIENT)
Age: 87
End: 2022-10-22

## 2023-11-05 ENCOUNTER — HEALTH MAINTENANCE LETTER (OUTPATIENT)
Age: 88
End: 2023-11-05

## 2025-02-12 NOTE — PATIENT INSTRUCTIONS
August 24, 2021    Thank you for allowing our Cardiology team to participate in your care.     Please note the following changes to your heart treatment plan:     Medication changes:   - take sublingual nitroglycerin as needed     Tests to be done:  - FASTING cholesterol labs in 1 year  - Exercise nuclear stress test in 1 year    Follow up:  - Follow up in 1 year, or sooner as needed.      Please contact our team at [phone] or [phone] for any questions or concerns.   For scheduling, please call [phone].  If you are having a medical emergency, please call [phone].     Sincerely,    Chaz Evans MD, FACC  Cardiology    [facility] - [facility] [place]     no